# Patient Record
Sex: MALE | Race: WHITE | Employment: OTHER | URBAN - METROPOLITAN AREA
[De-identification: names, ages, dates, MRNs, and addresses within clinical notes are randomized per-mention and may not be internally consistent; named-entity substitution may affect disease eponyms.]

---

## 2018-09-12 ENCOUNTER — HOSPITAL ENCOUNTER (EMERGENCY)
Age: 58
Discharge: HOME | End: 2018-09-12
Attending: EMERGENCY MEDICINE

## 2018-09-12 ENCOUNTER — APPOINTMENT (OUTPATIENT)
Dept: GENERAL RADIOLOGY | Age: 58
End: 2018-09-12

## 2018-09-12 VITALS
HEART RATE: 130 BPM | WEIGHT: 180 LBS | SYSTOLIC BLOOD PRESSURE: 147 MMHG | DIASTOLIC BLOOD PRESSURE: 99 MMHG | OXYGEN SATURATION: 100 % | RESPIRATION RATE: 25 BRPM | TEMPERATURE: 98.5 F

## 2018-09-12 DIAGNOSIS — K59.00 CONSTIPATION, UNSPECIFIED CONSTIPATION TYPE: Primary | ICD-10-CM

## 2018-09-12 LAB
A/G RATIO: 2 (ref 1.1–2.2)
ALBUMIN SERPL-MCNC: 4.9 G/DL (ref 3.4–5)
ALP BLD-CCNC: 69 U/L (ref 40–129)
ALT SERPL-CCNC: 47 U/L (ref 10–40)
ANION GAP SERPL CALCULATED.3IONS-SCNC: 13 MMOL/L (ref 3–16)
AST SERPL-CCNC: 34 U/L (ref 15–37)
BASOPHILS ABSOLUTE: 0.1 K/UL (ref 0–0.2)
BASOPHILS RELATIVE PERCENT: 1.3 %
BILIRUB SERPL-MCNC: 0.4 MG/DL (ref 0–1)
BUN BLDV-MCNC: 14 MG/DL (ref 7–20)
CALCIUM SERPL-MCNC: 10.1 MG/DL (ref 8.3–10.6)
CHLORIDE BLD-SCNC: 101 MMOL/L (ref 99–110)
CO2: 26 MMOL/L (ref 21–32)
CREAT SERPL-MCNC: 1.3 MG/DL (ref 0.9–1.3)
EOSINOPHILS ABSOLUTE: 0.2 K/UL (ref 0–0.6)
EOSINOPHILS RELATIVE PERCENT: 1.9 %
GFR AFRICAN AMERICAN: >60
GFR NON-AFRICAN AMERICAN: 57
GLOBULIN: 2.4 G/DL
GLUCOSE BLD-MCNC: 174 MG/DL (ref 70–99)
HCT VFR BLD CALC: 41.7 % (ref 40.5–52.5)
HEMOGLOBIN: 14.6 G/DL (ref 13.5–17.5)
LYMPHOCYTES ABSOLUTE: 1.9 K/UL (ref 1–5.1)
LYMPHOCYTES RELATIVE PERCENT: 20.5 %
MCH RBC QN AUTO: 28.7 PG (ref 26–34)
MCHC RBC AUTO-ENTMCNC: 35 G/DL (ref 31–36)
MCV RBC AUTO: 81.9 FL (ref 80–100)
MONOCYTES ABSOLUTE: 0.6 K/UL (ref 0–1.3)
MONOCYTES RELATIVE PERCENT: 6.3 %
NEUTROPHILS ABSOLUTE: 6.6 K/UL (ref 1.7–7.7)
NEUTROPHILS RELATIVE PERCENT: 70 %
PDW BLD-RTO: 12.8 % (ref 12.4–15.4)
PLATELET # BLD: 285 K/UL (ref 135–450)
PMV BLD AUTO: 7.6 FL (ref 5–10.5)
POTASSIUM SERPL-SCNC: 3.9 MMOL/L (ref 3.5–5.1)
RBC # BLD: 5.1 M/UL (ref 4.2–5.9)
SODIUM BLD-SCNC: 140 MMOL/L (ref 136–145)
TOTAL PROTEIN: 7.3 G/DL (ref 6.4–8.2)
WBC # BLD: 9.4 K/UL (ref 4–11)

## 2018-09-12 PROCEDURE — 96361 HYDRATE IV INFUSION ADD-ON: CPT

## 2018-09-12 PROCEDURE — 74018 RADEX ABDOMEN 1 VIEW: CPT

## 2018-09-12 PROCEDURE — 93005 ELECTROCARDIOGRAM TRACING: CPT | Performed by: EMERGENCY MEDICINE

## 2018-09-12 PROCEDURE — 2580000003 HC RX 258: Performed by: EMERGENCY MEDICINE

## 2018-09-12 PROCEDURE — 96360 HYDRATION IV INFUSION INIT: CPT

## 2018-09-12 PROCEDURE — 99284 EMERGENCY DEPT VISIT MOD MDM: CPT

## 2018-09-12 PROCEDURE — 80053 COMPREHEN METABOLIC PANEL: CPT

## 2018-09-12 PROCEDURE — 85025 COMPLETE CBC W/AUTO DIFF WBC: CPT

## 2018-09-12 PROCEDURE — 6370000000 HC RX 637 (ALT 250 FOR IP): Performed by: EMERGENCY MEDICINE

## 2018-09-12 RX ORDER — BISACODYL 10 MG
10 SUPPOSITORY, RECTAL RECTAL ONCE
Status: COMPLETED | OUTPATIENT
Start: 2018-09-12 | End: 2018-09-12

## 2018-09-12 RX ORDER — POLYETHYLENE GLYCOL 3350 17 G/17G
17 POWDER, FOR SOLUTION ORAL DAILY PRN
Qty: 527 G | Refills: 1 | Status: SHIPPED | OUTPATIENT
Start: 2018-09-12 | End: 2018-10-12

## 2018-09-12 RX ORDER — 0.9 % SODIUM CHLORIDE 0.9 %
1000 INTRAVENOUS SOLUTION INTRAVENOUS ONCE
Status: COMPLETED | OUTPATIENT
Start: 2018-09-12 | End: 2018-09-12

## 2018-09-12 RX ADMIN — SODIUM CHLORIDE 1000 ML: 9 INJECTION, SOLUTION INTRAVENOUS at 13:13

## 2018-09-12 RX ADMIN — BISACODYL 10 MG: 10 SUPPOSITORY RECTAL at 13:13

## 2018-09-12 ASSESSMENT — PAIN SCALES - GENERAL: PAINLEVEL_OUTOF10: 5

## 2018-09-12 NOTE — ED NOTES
Nisha Sterling RN at bedside to chaperone the insertion of suppository      Flor Phelps RN  09/12/18 0088

## 2018-09-12 NOTE — ED NOTES
Saline lock removed intact. IV site looked unremarkable. Pressure dressing applied. Discharge instructions reviewed with Mr. Brandi Lambert. He verbalized understanding. Copy of discharge instructions and prescriptions given. Mr. Brandi Lambert was discharged to home in good condition per personal vehicle, friend/family driving. He exited the ED without difficulty.         Eddy Rocha RN  09/12/18 9843

## 2018-09-12 NOTE — ED PROVIDER NOTES
Current Facility-Administered Medications   Medication Dose Route Frequency Provider Last Rate Last Dose    0.9 % sodium chloride bolus  1,000 mL Intravenous Once Va Avila MD 1,000 mL/hr at 09/12/18 1313 1,000 mL at 09/12/18 1313     Current Outpatient Prescriptions   Medication Sig Dispense Refill    polyethylene glycol (MIRALAX) packet Take 17 g by mouth daily as needed for Other (Constipation) May repeat hourly until you have a bowel movement. If you take too much this will cause diarrhea. You can take this daily to prevent constipation 527 g 1     No Known Allergies    Nursing Notes Reviewed    Physical Exam:  ED Triage Vitals [09/12/18 1228]   Enc Vitals Group      BP (!) 159/114      Pulse 160      Resp 25      Temp 98.5 °F (36.9 °C)      Temp Source Oral      SpO2 100 %      Weight 180 lb (81.6 kg)      Height       Head Circumference       Peak Flow       Pain Score       Pain Loc       Pain Edu? Excl. in [address]? My pulse ox interpretation is - normal    General appearance:  No acute distress. Very anxious  Skin:  Warm. Moist  Eye:  Extraocular movements intact. Ears, nose, mouth and throat:  Oral mucosa moist .  Throat is clear  Neck:  Trachea midline. Supple. Full range of motion   Extremity:  No swelling. Normal ROM     Heart:  Regular rate and rhythm,  Perfusion:  intact  Respiratory:  Lungs clear to auscultation bilaterally. Respirations nonlabored. Abdominal:  Normal bowel sounds. Soft. Nontender. Non distended. Back:  No CVA tenderness to palpation     Neurological:  Alert and oriented times 3. No focal neuro deficits.              Psychiatric:  Appropriate    I have reviewed and interpreted all of the currently available lab results from this visit (if applicable):  Results for orders placed or performed during the hospital encounter of 09/12/18   CBC Auto Differential   Result Value Ref Range    WBC 9.4 4.0 - 11.0 K/uL    RBC 5.10 4.20 - 5.90 M/uL Hemoglobin 14.6 13.5 - 17.5 g/dL    Hematocrit 41.7 40.5 - 52.5 %    MCV 81.9 80.0 - 100.0 fL    MCH 28.7 26.0 - 34.0 pg    MCHC 35.0 31.0 - 36.0 g/dL    RDW 12.8 12.4 - 15.4 %    Platelets 258 483 - 827 K/uL    MPV 7.6 5.0 - 10.5 fL    Neutrophils % 70.0 %    Lymphocytes % 20.5 %    Monocytes % 6.3 %    Eosinophils % 1.9 %    Basophils % 1.3 %    Neutrophils # 6.6 1.7 - 7.7 K/uL    Lymphocytes # 1.9 1.0 - 5.1 K/uL    Monocytes # 0.6 0.0 - 1.3 K/uL    Eosinophils # 0.2 0.0 - 0.6 K/uL    Basophils # 0.1 0.0 - 0.2 K/uL   Comprehensive Metabolic Panel   Result Value Ref Range    Sodium 140 136 - 145 mmol/L    Potassium 3.9 3.5 - 5.1 mmol/L    Chloride 101 99 - 110 mmol/L    CO2 26 21 - 32 mmol/L    Anion Gap 13 3 - 16    Glucose 174 (H) 70 - 99 mg/dL    BUN 14 7 - 20 mg/dL    CREATININE 1.3 0.9 - 1.3 mg/dL    GFR Non- 57 (A) >60    GFR African American >60 >60    Calcium 10.1 8.3 - 10.6 mg/dL    Total Protein 7.3 6.4 - 8.2 g/dL    Alb 4.9 3.4 - 5.0 g/dL    Albumin/Globulin Ratio 2.0 1.1 - 2.2    Total Bilirubin 0.4 0.0 - 1.0 mg/dL    Alkaline Phosphatase 69 40 - 129 U/L    ALT 47 (H) 10 - 40 U/L    AST 34 15 - 37 U/L    Globulin 2.4 g/dL   EKG 12 Lead   Result Value Ref Range    Ventricular Rate 150 BPM    Atrial Rate 150 BPM    P-R Interval 142 ms    QRS Duration 74 ms    Q-T Interval 336 ms    QTc Calculation (Bazett) 530 ms    P Axis 40 degrees    R Axis 30 degrees    T Axis 37 degrees    Diagnosis       Sinus tachycardia with Fusion complexesNonspecific ST and T wave abnormalityAbnormal ECG      Radiographs (if obtained):  [] The following radiograph was interpreted by myself in the absence of a radiologist:   [x] Radiologist's Report Reviewed:  XR ABDOMEN (KUB) (SINGLE AP VIEW)   Final Result   Indeterminate bowel-gas pattern with no dilated loops seen. Moderate colonic   stool burden.                EKG (if obtained): (All EKG's are interpreted by myself in the absence of a cardiologist)EKG shows sinus tachycardia with some fusion complexes, nonspecific ST and T-wave changes no STEMI this is an abnormal EKG. No prior EKG for comparison. Chart review shows recent radiographs:  No results found. MDM:  67-year-old male with constipation. Patient's hemoglobin is stable. His rectal bleeding is most likely from a tear from his constipation. However, cannot rule out any other causes. I have started him on medications to allow him to have a better bowel movements, but he should follow up with a primary care provider and should consider a colonoscopy. There is no signs of obstruction at this time, but if he should get worse or not able to have a bowel movement. He can return at any time. And as noted. Follow-up with primary care. Clinical Impression:  1. Constipation, unspecified constipation type      Disposition referral (if applicable):  Jess Fernandez  [phone]  Schedule an appointment as soon as possible for a visit in 2 days      [facility] ED  [address] [facility]  [phone]    If symptoms worsen    Disposition medications (if applicable):  New Prescriptions    POLYETHYLENE GLYCOL (MIRALAX) PACKET    Take 17 g by mouth daily as needed for Other (Constipation) May repeat hourly until you have a bowel movement. If you take too much this will cause diarrhea. You can take this daily to prevent constipation       Comment: Please note this report has been produced using speech recognition software and may contain errors related to that system including errors in grammar, punctuation, and spelling, as well as words and phrases that may be inappropriate. If there are any questions or concerns please feel free to contact the dictating provider for clarification. Indira Sanchez MD  09/12/18 5525

## 2018-09-13 LAB
EKG ATRIAL RATE: 150 BPM
EKG DIAGNOSIS: NORMAL
EKG P AXIS: 40 DEGREES
EKG P-R INTERVAL: 142 MS
EKG Q-T INTERVAL: 336 MS
EKG QRS DURATION: 74 MS
EKG QTC CALCULATION (BAZETT): 530 MS
EKG R AXIS: 30 DEGREES
EKG T AXIS: 37 DEGREES
EKG VENTRICULAR RATE: 150 BPM

## 2018-09-13 PROCEDURE — 93010 ELECTROCARDIOGRAM REPORT: CPT | Performed by: INTERNAL MEDICINE

## 2024-08-25 ENCOUNTER — APPOINTMENT (OUTPATIENT)
Dept: GENERAL RADIOLOGY | Age: 64
End: 2024-08-25

## 2024-08-25 ENCOUNTER — HOSPITAL ENCOUNTER (OUTPATIENT)
Age: 64
Setting detail: OBSERVATION
Discharge: HOME | End: 2024-08-26
Attending: EMERGENCY MEDICINE | Admitting: INTERNAL MEDICINE

## 2024-08-25 ENCOUNTER — APPOINTMENT (OUTPATIENT)
Dept: CT IMAGING | Age: 64
End: 2024-08-25

## 2024-08-25 DIAGNOSIS — R06.02 SHORTNESS OF BREATH: ICD-10-CM

## 2024-08-25 DIAGNOSIS — R06.00 DYSPNEA, UNSPECIFIED TYPE: ICD-10-CM

## 2024-08-25 DIAGNOSIS — R79.89 HIGH SERUM LACTATE: Primary | ICD-10-CM

## 2024-08-25 DIAGNOSIS — R00.0 TACHYCARDIA: ICD-10-CM

## 2024-08-25 LAB
ALBUMIN SERPL-MCNC: 4.7 G/DL (ref 3.4–5)
ALBUMIN/GLOB SERPL: 2.2 {RATIO} (ref 1.1–2.2)
ALP SERPL-CCNC: 57 U/L (ref 40–129)
ALT SERPL-CCNC: 39 U/L (ref 10–40)
ANION GAP SERPL CALCULATED.3IONS-SCNC: 19 MMOL/L (ref 3–16)
AST SERPL-CCNC: 29 U/L (ref 15–37)
BASE EXCESS BLDV CALC-SCNC: -1.1 MMOL/L (ref -3–3)
BASOPHILS # BLD: 0.1 K/UL (ref 0–0.2)
BASOPHILS NFR BLD: 1 %
BILIRUB SERPL-MCNC: 0.8 MG/DL (ref 0–1)
BILIRUB UR QL STRIP.AUTO: NEGATIVE
BUN SERPL-MCNC: 9 MG/DL (ref 7–20)
CALCIUM SERPL-MCNC: 9.9 MG/DL (ref 8.3–10.6)
CHLORIDE SERPL-SCNC: 99 MMOL/L (ref 99–110)
CLARITY UR: CLEAR
CO2 BLDV-SCNC: 19 MMOL/L
CO2 SERPL-SCNC: 18 MMOL/L (ref 21–32)
COHGB MFR BLDV: 1.5 % (ref 0–1.5)
COLOR UR: YELLOW
CREAT SERPL-MCNC: 1.2 MG/DL (ref 0.8–1.3)
D-DIMER QUANTITATIVE: <0.27 UG/ML FEU (ref 0–0.6)
DEPRECATED RDW RBC AUTO: 13.1 % (ref 12.4–15.4)
EKG ATRIAL RATE: 127 BPM
EKG DIAGNOSIS: NORMAL
EKG P AXIS: 63 DEGREES
EKG P-R INTERVAL: 180 MS
EKG Q-T INTERVAL: 282 MS
EKG QRS DURATION: 66 MS
EKG QTC CALCULATION (BAZETT): 409 MS
EKG R AXIS: 40 DEGREES
EKG T AXIS: 56 DEGREES
EKG VENTRICULAR RATE: 127 BPM
EOSINOPHIL # BLD: 0 K/UL (ref 0–0.6)
EOSINOPHIL NFR BLD: 0.6 %
FLUAV RNA RESP QL NAA+PROBE: NOT DETECTED
FLUBV RNA RESP QL NAA+PROBE: NOT DETECTED
GFR SERPLBLD CREATININE-BSD FMLA CKD-EPI: 68 ML/MIN/{1.73_M2}
GLUCOSE SERPL-MCNC: 133 MG/DL (ref 70–99)
GLUCOSE UR STRIP.AUTO-MCNC: NEGATIVE MG/DL
HCO3 BLDV-SCNC: 18.5 MMOL/L (ref 23–29)
HCT VFR BLD AUTO: 44.2 % (ref 40.5–52.5)
HGB BLD-MCNC: 14.9 G/DL (ref 13.5–17.5)
HGB UR QL STRIP.AUTO: NEGATIVE
KETONES UR STRIP.AUTO-MCNC: NEGATIVE MG/DL
LACTATE BLDV-SCNC: 2.9 MMOL/L (ref 0.4–1.9)
LACTATE BLDV-SCNC: 3.3 MMOL/L (ref 0.4–1.9)
LEUKOCYTE ESTERASE UR QL STRIP.AUTO: ABNORMAL
LYMPHOCYTES # BLD: 2.4 K/UL (ref 1–5.1)
LYMPHOCYTES NFR BLD: 38.6 %
MAGNESIUM SERPL-MCNC: 2 MG/DL (ref 1.8–2.4)
MCH RBC QN AUTO: 28.6 PG (ref 26–34)
MCHC RBC AUTO-ENTMCNC: 33.8 G/DL (ref 31–36)
MCV RBC AUTO: 84.6 FL (ref 80–100)
METHGB MFR BLDV: 0.3 %
MONOCYTES # BLD: 0.4 K/UL (ref 0–1.3)
MONOCYTES NFR BLD: 6.6 %
NEUTROPHILS # BLD: 3.3 K/UL (ref 1.7–7.7)
NEUTROPHILS NFR BLD: 53.2 %
NITRITE UR QL STRIP.AUTO: NEGATIVE
NT-PROBNP SERPL-MCNC: <36 PG/ML (ref 0–124)
O2 CT VFR BLDV CALC: 20 VOL %
O2 THERAPY: ABNORMAL
PCO2 BLDV: 20.8 MMHG (ref 40–50)
PH BLDV: 7.57 [PH] (ref 7.35–7.45)
PH UR STRIP.AUTO: 8 [PH] (ref 5–8)
PLATELET # BLD AUTO: 266 K/UL (ref 135–450)
PMV BLD AUTO: 9.7 FL (ref 5–10.5)
PO2 BLDV: 50.3 MMHG (ref 25–40)
POTASSIUM SERPL-SCNC: 3.5 MMOL/L (ref 3.5–5.1)
PROCALCITONIN SERPL IA-MCNC: 0.04 NG/ML (ref 0–0.15)
PROT SERPL-MCNC: 6.8 G/DL (ref 6.4–8.2)
PROT UR STRIP.AUTO-MCNC: NEGATIVE MG/DL
RBC # BLD AUTO: 5.22 M/UL (ref 4.2–5.9)
RBC #/AREA URNS HPF: NORMAL /HPF (ref 0–4)
SAO2 % BLDV: 91 %
SARS-COV-2 RNA RESP QL NAA+PROBE: NOT DETECTED
SODIUM SERPL-SCNC: 136 MMOL/L (ref 136–145)
SP GR UR STRIP.AUTO: 1.01 (ref 1–1.03)
TROPONIN, HIGH SENSITIVITY: 11 NG/L (ref 0–22)
TROPONIN, HIGH SENSITIVITY: 9 NG/L (ref 0–22)
TSH SERPL DL<=0.005 MIU/L-ACNC: 2.28 UIU/ML (ref 0.27–4.2)
UA COMPLETE W REFLEX CULTURE PNL UR: ABNORMAL
UA DIPSTICK W REFLEX MICRO PNL UR: YES
URN SPEC COLLECT METH UR: ABNORMAL
UROBILINOGEN UR STRIP-ACNC: 0.2 E.U./DL
WBC # BLD AUTO: 6.2 K/UL (ref 4–11)
WBC #/AREA URNS HPF: NORMAL /HPF (ref 0–5)

## 2024-08-25 PROCEDURE — 71250 CT THORAX DX C-: CPT

## 2024-08-25 PROCEDURE — 99285 EMERGENCY DEPT VISIT HI MDM: CPT

## 2024-08-25 PROCEDURE — 2580000003 HC RX 258: Performed by: NURSE PRACTITIONER

## 2024-08-25 PROCEDURE — 99223 1ST HOSP IP/OBS HIGH 75: CPT | Performed by: NURSE PRACTITIONER

## 2024-08-25 PROCEDURE — 93010 ELECTROCARDIOGRAM REPORT: CPT | Performed by: INTERNAL MEDICINE

## 2024-08-25 PROCEDURE — 84443 ASSAY THYROID STIM HORMONE: CPT

## 2024-08-25 PROCEDURE — 80061 LIPID PANEL: CPT

## 2024-08-25 PROCEDURE — 71045 X-RAY EXAM CHEST 1 VIEW: CPT

## 2024-08-25 PROCEDURE — 85025 COMPLETE CBC W/AUTO DIFF WBC: CPT

## 2024-08-25 PROCEDURE — 83880 ASSAY OF NATRIURETIC PEPTIDE: CPT

## 2024-08-25 PROCEDURE — 84145 PROCALCITONIN (PCT): CPT

## 2024-08-25 PROCEDURE — 6360000002 HC RX W HCPCS: Performed by: EMERGENCY MEDICINE

## 2024-08-25 PROCEDURE — 96374 THER/PROPH/DIAG INJ IV PUSH: CPT

## 2024-08-25 PROCEDURE — 96361 HYDRATE IV INFUSION ADD-ON: CPT

## 2024-08-25 PROCEDURE — 85379 FIBRIN DEGRADATION QUANT: CPT

## 2024-08-25 PROCEDURE — G0378 HOSPITAL OBSERVATION PER HR: HCPCS

## 2024-08-25 PROCEDURE — 83605 ASSAY OF LACTIC ACID: CPT

## 2024-08-25 PROCEDURE — 82803 BLOOD GASES ANY COMBINATION: CPT

## 2024-08-25 PROCEDURE — 84484 ASSAY OF TROPONIN QUANT: CPT

## 2024-08-25 PROCEDURE — 83036 HEMOGLOBIN GLYCOSYLATED A1C: CPT

## 2024-08-25 PROCEDURE — 80053 COMPREHEN METABOLIC PANEL: CPT

## 2024-08-25 PROCEDURE — 2580000003 HC RX 258: Performed by: EMERGENCY MEDICINE

## 2024-08-25 PROCEDURE — 93005 ELECTROCARDIOGRAM TRACING: CPT | Performed by: EMERGENCY MEDICINE

## 2024-08-25 PROCEDURE — 83735 ASSAY OF MAGNESIUM: CPT

## 2024-08-25 PROCEDURE — 87636 SARSCOV2 & INF A&B AMP PRB: CPT

## 2024-08-25 PROCEDURE — 81001 URINALYSIS AUTO W/SCOPE: CPT

## 2024-08-25 RX ORDER — SODIUM CHLORIDE 0.9 % (FLUSH) 0.9 %
5-40 SYRINGE (ML) INJECTION PRN
Status: DISCONTINUED | OUTPATIENT
Start: 2024-08-25 | End: 2024-08-26 | Stop reason: HOSPADM

## 2024-08-25 RX ORDER — ENOXAPARIN SODIUM 100 MG/ML
40 INJECTION SUBCUTANEOUS DAILY
Status: DISCONTINUED | OUTPATIENT
Start: 2024-08-25 | End: 2024-08-26 | Stop reason: HOSPADM

## 2024-08-25 RX ORDER — POTASSIUM CHLORIDE 1500 MG/1
40 TABLET, EXTENDED RELEASE ORAL PRN
Status: DISCONTINUED | OUTPATIENT
Start: 2024-08-25 | End: 2024-08-26 | Stop reason: HOSPADM

## 2024-08-25 RX ORDER — POLYETHYLENE GLYCOL 3350 17 G/17G
17 POWDER, FOR SOLUTION ORAL DAILY PRN
Status: DISCONTINUED | OUTPATIENT
Start: 2024-08-25 | End: 2024-08-26 | Stop reason: HOSPADM

## 2024-08-25 RX ORDER — 0.9 % SODIUM CHLORIDE 0.9 %
1000 INTRAVENOUS SOLUTION INTRAVENOUS ONCE
Status: COMPLETED | OUTPATIENT
Start: 2024-08-25 | End: 2024-08-25

## 2024-08-25 RX ORDER — ONDANSETRON 2 MG/ML
4 INJECTION INTRAMUSCULAR; INTRAVENOUS EVERY 6 HOURS PRN
Status: DISCONTINUED | OUTPATIENT
Start: 2024-08-25 | End: 2024-08-26 | Stop reason: HOSPADM

## 2024-08-25 RX ORDER — MAGNESIUM SULFATE IN WATER 40 MG/ML
2000 INJECTION, SOLUTION INTRAVENOUS PRN
Status: DISCONTINUED | OUTPATIENT
Start: 2024-08-25 | End: 2024-08-26 | Stop reason: HOSPADM

## 2024-08-25 RX ORDER — ACETAMINOPHEN 650 MG/1
650 SUPPOSITORY RECTAL EVERY 6 HOURS PRN
Status: DISCONTINUED | OUTPATIENT
Start: 2024-08-25 | End: 2024-08-26 | Stop reason: HOSPADM

## 2024-08-25 RX ORDER — SODIUM CHLORIDE 0.9 % (FLUSH) 0.9 %
5-40 SYRINGE (ML) INJECTION EVERY 12 HOURS SCHEDULED
Status: DISCONTINUED | OUTPATIENT
Start: 2024-08-25 | End: 2024-08-26 | Stop reason: HOSPADM

## 2024-08-25 RX ORDER — ONDANSETRON 4 MG/1
4 TABLET, ORALLY DISINTEGRATING ORAL EVERY 8 HOURS PRN
Status: DISCONTINUED | OUTPATIENT
Start: 2024-08-25 | End: 2024-08-26 | Stop reason: HOSPADM

## 2024-08-25 RX ORDER — POTASSIUM CHLORIDE 7.45 MG/ML
10 INJECTION INTRAVENOUS PRN
Status: DISCONTINUED | OUTPATIENT
Start: 2024-08-25 | End: 2024-08-26 | Stop reason: HOSPADM

## 2024-08-25 RX ORDER — ACETAMINOPHEN 325 MG/1
650 TABLET ORAL EVERY 6 HOURS PRN
Status: DISCONTINUED | OUTPATIENT
Start: 2024-08-25 | End: 2024-08-26 | Stop reason: HOSPADM

## 2024-08-25 RX ORDER — SODIUM CHLORIDE 9 MG/ML
INJECTION, SOLUTION INTRAVENOUS PRN
Status: DISCONTINUED | OUTPATIENT
Start: 2024-08-25 | End: 2024-08-26 | Stop reason: HOSPADM

## 2024-08-25 RX ORDER — LORAZEPAM 2 MG/ML
0.5 INJECTION INTRAMUSCULAR
Status: COMPLETED | OUTPATIENT
Start: 2024-08-25 | End: 2024-08-25

## 2024-08-25 RX ADMIN — LORAZEPAM 0.5 MG: 2 INJECTION INTRAMUSCULAR; INTRAVENOUS at 09:23

## 2024-08-25 RX ADMIN — SODIUM CHLORIDE, PRESERVATIVE FREE 10 ML: 5 INJECTION INTRAVENOUS at 20:43

## 2024-08-25 RX ADMIN — SODIUM CHLORIDE 1000 ML: 9 INJECTION, SOLUTION INTRAVENOUS at 09:26

## 2024-08-25 ASSESSMENT — PAIN - FUNCTIONAL ASSESSMENT
PAIN_FUNCTIONAL_ASSESSMENT: NONE - DENIES PAIN
PAIN_FUNCTIONAL_ASSESSMENT: NONE - DENIES PAIN

## 2024-08-25 ASSESSMENT — LIFESTYLE VARIABLES
HOW MANY STANDARD DRINKS CONTAINING ALCOHOL DO YOU HAVE ON A TYPICAL DAY: PATIENT DOES NOT DRINK
HOW MANY STANDARD DRINKS CONTAINING ALCOHOL DO YOU HAVE ON A TYPICAL DAY: PATIENT DOES NOT DRINK
HOW OFTEN DO YOU HAVE A DRINK CONTAINING ALCOHOL: NEVER
HOW OFTEN DO YOU HAVE A DRINK CONTAINING ALCOHOL: NEVER

## 2024-08-25 NOTE — H&P
Hospital Medicine History & Physical      Date of Admission: 8/25/2024    Date of Service:  Pt seen/examined on 8/25/24      [x]Admitted to Inpatient with expected LOS greater than two midnights due to medical therapy.  []Placed in Observation status.    Chief Admission Complaint:    Shortness of Breath (Felt short of breath since last Tuesday.)     Presenting Admission History:    \" Selvin Ngo is a 64 y.o. male who presents to the emergency department for evaluation of shortness of breath.  Reports feeling short of breath last Tuesday.  Since then, has been feeling persistently short of breath.  Denies having anything like this in the past.  No history of asthma or COPD.  Remote history of smoking (quit over 20 years ago).      Feels like he can breathe better when he lays over to his left side.  Had some runny nose.  No fevers or chills.  Denies cough but noted to have a cough while I was in the room.      Reports he has been feeling short of breath with exertion, such as mowing the lawn. Does not have doctors because he does not have insurance.      No other complaints, modifying factors or associated symptoms.     The patient denies any fever/chills or other signs/sxs of systemic illness or identifiable aggravating/alleviating factors\"    ED work up: HTN on admission 160-140/100,  on arrival,100% on RA, CXR no acute processes, ECG St non acute, DDimer WNL Trop normal. COVID and flu negative, BNP WNL.     The pt was admitted under hosptialist service:     On y exam pt is in room NAD, denies any sob, \" I have had no issues since I have been here\". He notes that the sob is intermittent and that last night he felt like his heart rate was fast and pounding. No other complaints. He states he has lost about 12 pind recently but cut the cookies out seems intentional.        Assessment/Plan:      Current Principal Problem:  Dyspnea    Dyspnea: uncertain etiology. Hx of remote smoking and ETOh use but  Conjunctivae/corneas clear.  Respiratory:  Normal respiratory effort. Clear to auscultation bilaterally without Rales/Wheezes/Rhonchi.  Cardiovascular:  Regular rate and rhythm with normal S1/S2 without murmurs, rubs or gallops.  Abdomen:  Soft, non-tender, non-distended with normal bowel sounds.  Musculoskeletal:  No clubbing, cyanosis or edema bilaterally.  Full range of motion without deformity.  Neurologic:  Neurovascularly intact without any focal sensory/motor deficits. Cranial nerves: II-XII intact, grossly non-focal.  Psychiatric:  Alert and oriented, thought content appropriate, normal insight  Skin:  Skin color, texture, turgor normal.  No rashes or lesions.  Capillary Refill:  Brisk,< 3 seconds   Peripheral Pulses:  +2 palpable, equal bilaterally     Diet: [x]Adult/General  []Cardiac  []Diabetic  []Low Fat  []NPO  []NPO after Midnight  []Other     DVT Prophylaxis: [x]PPx LMWH  []SQ Heparin  []IPC/SCDs  []Eliquis  []Xarelto  []Coumadin     Code status: [x]Full  []DNR/CCA  []Limited (DNR/CCA with Do Not Intubate)  []DNRCC    PT/OT Eval Status:   []NOT yet ordered  []Ordered and Pending   []Seen with Recommendations for:  []Home independently  []Home w/ assist  []HHC  []SNF  []Acute Rehab    Anticipated Discharge Day/Date:  Patient is likely to remain in-house at least until 8/26 pending clinical course, subspecialty recs and PT/OT eval/recs if/when medically appropriate.       Anticipated Discharge Location: [x]Home  []HHC  []SNF  []Acute Rehab  []ECF  []LTAC  []Hospice    Time spent on examination, evaluation, counseling and management of the patient outside of separately billed procedures is 75 minutes.     Consults:      IP CONSULT TO HOSPITALIST      I personally have obtained, updated and/or reviewed the patient’s medication list on 8/25/2024

## 2024-08-25 NOTE — FLOWSHEET NOTE
08/25/24 1400   Vital Signs   Temp 98.1 °F (36.7 °C)   Temp Source Oral   Pulse 76   Heart Rate Source Monitor   Respirations 16   BP (!) 140/95   MAP (Calculated) 110   BP Location Left upper arm   BP Method Automatic   Patient Position High fowlers   Pain Assessment   Pain Assessment None - Denies Pain   Opioid-Induced Sedation   POSS Score 1   Oxygen Therapy   SpO2 98 %   O2 Device None (Room air)     Patient arrived via w/c from ER.  VSS. Skin w/d. Resp ee unlabored. Orders released. Oriented to room/call light.  Nrsg asmt completed. Call light in easy reach. No s/s distress noted. Telemetry in place

## 2024-08-25 NOTE — ED PROVIDER NOTES
CULTURE - Abnormal; Notable for the following components:    Leukocyte Esterase, Urine SMALL (*)     All other components within normal limits   COVID-19 & INFLUENZA COMBO   CBC WITH AUTO DIFFERENTIAL   BRAIN NATRIURETIC PEPTIDE   TROPONIN   MAGNESIUM   D-DIMER, QUANTITATIVE   PROCALCITONIN   TROPONIN   MICROSCOPIC URINALYSIS       When ordered only abnormal lab results are displayed. All other labs were within normal range or not returned as of this dictation.    EKG  The EKG, as interpreted by myself, in the emergency department in the absence of a cardiologist.    The Ekg interpreted by me shows  Sinus tachycardia with a rate of 127  Axis is normal  QTc is normal  Intervals and Durations are unremarkable.      ST Segments: Nonspecific ST changes    RADIOLOGY:     Non-plain film images such as CT, Ultrasound and MRI are read by the radiologist. Plain radiographic images personally reviewed.     Interpretation per the Radiologist below, if available at the time of this note:  XR CHEST PORTABLE   Final Result   1.  No acute abnormality.           XR CHEST PORTABLE    Result Date: 8/25/2024  EXAMINATION: ONE XRAY VIEW OF THE CHEST 8/25/2024 8:36 am COMPARISON: None available. HISTORY: ORDERING SYSTEM PROVIDED HISTORY: soa TECHNOLOGIST PROVIDED HISTORY: Reason for exam:->soa Reason for Exam: Shortness of breath FINDINGS: The lungs are clear.  The cardiac silhouette is within normal limits.  There is no pneumothorax or pleural effusion.     1.  No acute abnormality.         Bedside Ultrasound, as interpreted by me, if performed:    No results found.    PROCEDURES     Unless otherwise noted below, none     Procedures    CRITICAL CARE TIME     I personally spent a total of 31 minutes of critical care time in obtaining history, performing a physical exam, bedside monitoring of interventions, collecting and interpreting tests and discussion with consultants but excluding time spent performing procedures, treating other  patients and teaching time.                                                                                                         EMERGENCY DEPARTMENT COURSE and DIFFERENTIAL DIAGNOSIS/MDM:     Patient seen and evaluated. At presentation, patient was awake, alert, afebrile, clinically nontoxic-appearing, hypertensive to 161/104, tachycardic to 126, and satting 100% on room air.  He was placed on cardiac monitor for close observation.  Differential diagnosis includes ACS, arrhythmia, PE, pneumonia, others.  he was given 1 L IV fluid bolus. VBG with pH of 7.566, pCO2 20, bicarb 18.  Lactate elevated at 3.3.  No leukocytosis.  Hemoglobin normal at 14.9.  Creatinine is 1.2.  No change compared to baseline.  BNP less than 36.  Troponin is within normal limits at 9.  D-dimer was less than 0.27.  Therefore, patient at low risk for PE and no further imaging indicated at this time.  Chest x-ray shows no acute abnormality.  After 1 L IV fluid bolus, repeat lactic again elevated 2.9.  Heart rate is improved to 81.  I discussed the results of the workup with patient.  He denies having any liver issues.  Denies having any reason to be dehydrated.  Has not been vomiting or having diarrhea.  Has no abdominal pain.  Denies any alcohol use in the past several years.  He reports sister passed away recently, but he has not been feeling anxious.  Says he had multiple family members passed away over the last several years and never had persistent shortness of breath like this.  Does not feel like it is due to anxiety.  Reports having exertional shortness of breath that has become more persistent over the past week.  Says he still feels short of breath currently.  Given this, hospitalist consulted for admission for further evaluation and treatment.  Admit.    Discussion with Other Profesionals : Admitting Team - hospitalist    Social Determinants Significantly Affecting Health : Patient does not have insurance-  Does not have any

## 2024-08-25 NOTE — PROGRESS NOTES
4 Eyes Skin Assessment     NAME:  Selvin Ngo  YOB: 1960  MEDICAL RECORD NUMBER:  [id number]    The patient is being assessed for  Admission    I agree that at least one RN has performed a thorough Head to Toe Skin Assessment on the patient. ALL assessment sites listed below have been assessed.      Areas assessed by both nurses:    Head, Face, Ears, Shoulders, Back, Chest, and Arms, Elbows, Hands        Refused lower extremity and feet.  States no skin issues here for breathing     Does the Patient have a Wound? no       Tarik Prevention initiated by RN: No  Wound Care Orders initiated by RN: No    Pressure Injury (Stage 3,4, Unstageable, DTI, NWPT, and Complex wounds) if present, place Wound referral order by RN under : No    New Ostomies, if present place, Ostomy referral order under : No     Nurse 1 eSignature: Electronically signed by Pooja Woods RN on 8/25/24 at 2:26 PM EDT    **SHARE this note so that the co-signing nurse can place an eSignature**    Nurse 2 eSignature: Electronically signed by Silvia Ross RN on 8/25/24 at 2:29 PM EDT      Patient is able to demonstrate the ability to move from a reclining position to an upright position within the recliner.

## 2024-08-26 ENCOUNTER — APPOINTMENT (OUTPATIENT)
Age: 64
End: 2024-08-26

## 2024-08-26 VITALS
HEART RATE: 80 BPM | HEIGHT: 66 IN | SYSTOLIC BLOOD PRESSURE: 135 MMHG | OXYGEN SATURATION: 98 % | TEMPERATURE: 98.5 F | DIASTOLIC BLOOD PRESSURE: 86 MMHG | BODY MASS INDEX: 26.03 KG/M2 | RESPIRATION RATE: 61 BRPM | WEIGHT: 162 LBS

## 2024-08-26 LAB
CHOLEST SERPL-MCNC: 162 MG/DL (ref 0–199)
ECHO AO ASC DIAM: 3.4 CM
ECHO AO ASCENDING AORTA INDEX: 1.86 CM/M2
ECHO AO ROOT DIAM: 3.8 CM
ECHO AO ROOT INDEX: 2.08 CM/M2
ECHO AV MEAN GRADIENT: 8 MMHG
ECHO AV MEAN VELOCITY: 1.3 M/S
ECHO AV PEAK GRADIENT: 14 MMHG
ECHO AV PEAK VELOCITY: 1.8 M/S
ECHO AV VELOCITY RATIO: 0.61
ECHO AV VTI: 27.9 CM
ECHO BSA: 1.85 M2
ECHO IVC EXP: 1.6 CM
ECHO IVC INSP: 0.6 CM
ECHO LA AREA 2C: 17.1 CM2
ECHO LA AREA 4C: 12.9 CM2
ECHO LA MAJOR AXIS: 5.1 CM
ECHO LA MINOR AXIS: 5.1 CM
ECHO LA VOL BP: 34 ML (ref 18–58)
ECHO LA VOL MOD A2C: 46 ML (ref 18–58)
ECHO LA VOL MOD A4C: 25 ML (ref 18–58)
ECHO LA VOL/BSA BIPLANE: 19 ML/M2 (ref 16–34)
ECHO LA VOLUME INDEX MOD A2C: 25 ML/M2 (ref 16–34)
ECHO LA VOLUME INDEX MOD A4C: 14 ML/M2 (ref 16–34)
ECHO LV E' LATERAL VELOCITY: 16 CM/S
ECHO LV E' SEPTAL VELOCITY: 11 CM/S
ECHO LV EF PHYSICIAN: 68 %
ECHO LV FRACTIONAL SHORTENING: 39 % (ref 28–44)
ECHO LV INTERNAL DIMENSION DIASTOLE INDEX: 2.68 CM/M2
ECHO LV INTERNAL DIMENSION DIASTOLIC: 4.9 CM (ref 4.2–5.9)
ECHO LV INTERNAL DIMENSION SYSTOLIC INDEX: 1.64 CM/M2
ECHO LV INTERNAL DIMENSION SYSTOLIC: 3 CM
ECHO LV IVSD: 0.9 CM (ref 0.6–1)
ECHO LV MASS 2D: 153 G (ref 88–224)
ECHO LV MASS INDEX 2D: 83.6 G/M2 (ref 49–115)
ECHO LV POSTERIOR WALL DIASTOLIC: 0.9 CM (ref 0.6–1)
ECHO LV RELATIVE WALL THICKNESS RATIO: 0.37
ECHO LVOT AV VTI INDEX: 0.64
ECHO LVOT MEAN GRADIENT: 3 MMHG
ECHO LVOT PEAK GRADIENT: 5 MMHG
ECHO LVOT PEAK VELOCITY: 1.1 M/S
ECHO LVOT VTI: 17.9 CM
ECHO MV A VELOCITY: 1.24 M/S
ECHO MV E VELOCITY: 0.59 M/S
ECHO MV E/A RATIO: 0.48
ECHO MV E/E' LATERAL: 3.69
ECHO MV E/E' RATIO (AVERAGED): 4.53
ECHO MV E/E' SEPTAL: 5.36
ECHO MV LVOT VTI INDEX: 1.33
ECHO MV MAX VELOCITY: 1.6 M/S
ECHO MV MEAN GRADIENT: 6 MMHG
ECHO MV MEAN VELOCITY: 1.2 M/S
ECHO MV PEAK GRADIENT: 10 MMHG
ECHO MV VTI: 23.8 CM
ECHO PV MAX VELOCITY: 1.4 M/S
ECHO PV MEAN GRADIENT: 5 MMHG
ECHO PV MEAN VELOCITY: 1 M/S
ECHO PV PEAK GRADIENT: 8 MMHG
ECHO PV VTI: 21.5 CM
ECHO RA AREA 4C: 9.8 CM2
ECHO RA END SYSTOLIC VOLUME APICAL 4 CHAMBER INDEX BSA: 10 ML/M2
ECHO RA VOLUME: 19 ML
ECHO RV BASAL DIMENSION: 3.3 CM
ECHO RV FREE WALL PEAK S': 19 CM/S
ECHO RV LONGITUDINAL DIMENSION: 7.9 CM
ECHO RV MID DIMENSION: 2.4 CM
ECHO RV TAPSE: 2.3 CM (ref 1.7–?)
EST. AVERAGE GLUCOSE BLD GHB EST-MCNC: 105.4 MG/DL
HBA1C MFR BLD: 5.3 %
HDLC SERPL-MCNC: 47 MG/DL (ref 40–60)
LDLC SERPL CALC-MCNC: 98 MG/DL
TRIGL SERPL-MCNC: 87 MG/DL (ref 0–150)
VLDLC SERPL CALC-MCNC: 17 MG/DL

## 2024-08-26 PROCEDURE — G0378 HOSPITAL OBSERVATION PER HR: HCPCS

## 2024-08-26 PROCEDURE — 93306 TTE W/DOPPLER COMPLETE: CPT | Performed by: INTERNAL MEDICINE

## 2024-08-26 PROCEDURE — 6360000002 HC RX W HCPCS: Performed by: NURSE PRACTITIONER

## 2024-08-26 PROCEDURE — 93306 TTE W/DOPPLER COMPLETE: CPT

## 2024-08-26 PROCEDURE — 96372 THER/PROPH/DIAG INJ SC/IM: CPT

## 2024-08-26 RX ADMIN — ENOXAPARIN SODIUM 40 MG: 100 INJECTION SUBCUTANEOUS at 08:57

## 2024-08-26 NOTE — PROGRESS NOTES
Report given @ bedside to Sudha TREVINO, for transferral of care. Pt resting in bed. New D/C ordered noted, AVS information completed by neto Treivno and Sudha will discharge. Call light in reach.

## 2024-08-26 NOTE — CARE COORDINATION
Case Management Assessment  Initial Evaluation    Date/Time of Evaluation: 8/26/2024 12:35 PM  Assessment Completed by: Juanita Vila    If patient is discharged prior to next notation, then this note serves as note for discharge by case management.    Patient Name: Selvin Ngo                   YOB: 1960  Diagnosis: Shortness of breath [R06.02]  Dyspnea [R06.00]  Tachycardia [R00.0]  High serum lactate [R79.89]  Dyspnea, unspecified type [R06.00]                   Date / Time: 8/25/2024  8:06 AM    Patient Admission Status: Observation   Readmission Risk (Low < 19, Mod (19-27), High > 27): No data recorded  Current PCP: No primary care provider on file.  PCP verified by CM? Yes (none)    Chart Reviewed: Yes      History Provided by: Patient  Patient Orientation: Alert and Oriented    Patient Cognition: Alert    Hospitalization in the last 30 days (Readmission):  No    If yes, Readmission Assessment in CM Navigator will be completed.    Advance Directives:      Code Status: Full Code   Patient's Primary Decision Maker is: Patient Declined (Legal Next of Kin Remains as Decision Maker)      Discharge Planning:    Patient lives with: Alone Type of Home: House  Primary Care Giver: Self  Patient Support Systems include: Family Members   Current Financial resources: Medicare  Current community resources: None  Current services prior to admission: None            Current DME:              Type of Home Care services:  None    ADLS  Prior functional level: Independent in ADLs/IADLs  Current functional level: Independent in ADLs/IADLs    PT AM-PAC:   /24  OT AM-PAC:   /24    Family can provide assistance at DC: No  Would you like Case Management to discuss the discharge plan with any other family members/significant others, and if so, who? No  Plans to Return to Present Housing: Yes  Other Identified Issues/Barriers to RETURNING to current housing: none  Potential Assistance needed at discharge: N/A

## 2024-08-26 NOTE — PLAN OF CARE
Problem: Discharge Planning  Goal: Discharge to home or other facility with appropriate resources  8/25/2024 2230 by Lorin Anderson, RN  Outcome: Progressing  Flowsheets (Taken 8/25/2024 1423 by Pooja Woods RN)  Discharge to home or other facility with appropriate resources: Identify barriers to discharge with patient and caregiver  8/25/2024 1418 by Pooja Woods RN  Outcome: Progressing     Problem: Respiratory - Adult  Goal: Achieves optimal ventilation and oxygenation  8/25/2024 2230 by Lorin Anderson, RN  Outcome: Progressing  8/25/2024 1418 by Pooja Woods RN  Outcome: Progressing

## 2024-08-26 NOTE — PROGRESS NOTES
No acute events noted throughout the shift. Provider was notified at 0115 of patient going angel to as low as 45. VSS. Patient asymptomatic with no complaints. No new complaints or issues voiced. Patient up independently in room. Glasses and one hearing aid on bedside table. Call bell within reach and side rails up x 2.

## 2024-08-26 NOTE — DISCHARGE INSTRUCTIONS
Your information:  Name: Selvin Ngo  : 1960    Your instructions:    Follow-up [facility] in 1 week, call for appointment [phone]. They take your medicare and do a sliding scale payment plan.       What to do after you leave the hospital:    Recommended diet: regular diet    Recommended activity: activity as tolerated        The following personal items were collected during your admission and were returned to you:    Belongings  Dental Appliances: Uppers  Vision - Corrective Lenses: Eyeglasses  Hearing Aid: Left hearing aid  Clothing: Belt, Footwear, Pants, Shirt, Undergarments  Jewelry: None  Body Piercings Removed: No  Electronic Devices: Cell Phone  Weapons (Notify Protective Services/Security): None  Other Valuables: Wallet, [place]  Home Medications: None  Valuables Given To: Patient  Provide Name(s) of Who Valuable(s) Were Given To: patient  Responsible person(s) in the waiting room: selvin  Patient approves for provider to speak to responsible person post operatively: Yes    Information obtained by:  By signing below, I understand that if any problems occur once I leave the hospital I am to contact PCP.  I understand and acknowledge receipt of the instructions indicated above.

## 2024-08-26 NOTE — PROGRESS NOTES
IM Progress Note    Admit Date:  8/25/2024    Subjective:  Mr. Ngo ***    Objective:   /86   Pulse 93   Temp 98.2 °F (36.8 °C) (Oral)   Resp 16   Ht 1.676 m (5' 6\")   Wt 73.5 kg (162 lb)   SpO2 98%   BMI 26.15 kg/m²     Intake/Output Summary (Last 24 hours) at 8/26/2024 1609  Last data filed at 8/26/2024 0429  Gross per 24 hour   Intake 220 ml   Output 0 ml   Net 220 ml         Physical Exam:  General:  Awake, alert, NAD  Skin:  Warm and dry  Neck:  JVD absent. Neck supple  Chest:  Clear to auscultation, respiration easy. No wheezes, rales or rhonchi.   Cardiovascular:  RRR ,S1S2 normal  Abdomen:  Soft, non tender, non distended, BS +  Extremities:  No edema.  Intact peripheral pulses. Brisk cap refill, < 2 secs  Neuro: non focal      Medications:   Scheduled Meds:   sodium chloride flush  5-40 mL IntraVENous 2 times per day    enoxaparin  40 mg SubCUTAneous Daily       Continuous Infusions:   sodium chloride         Data:  CBC:   Recent Labs     08/25/24  0837   WBC 6.2   RBC 5.22   HGB 14.9   HCT 44.2   MCV 84.6   RDW 13.1        BMP:   Recent Labs     08/25/24  0837      K 3.5   CL 99   CO2 18*   BUN 9   CREATININE 1.2     BNP: No results for input(s): \"BNP\" in the last 72 hours.  PT/INR: No results for input(s): \"PROTIME\", \"INR\" in the last 72 hours.  APTT: No results for input(s): \"APTT\" in the last 72 hours.  CARDIAC ENZYMES: No results for input(s): \"CKMB\", \"CKMBINDEX\", \"TROPONINI\" in the last 72 hours.    Invalid input(s): \"CKTOTAL;3\"  FASTING LIPID PANEL:  Lab Results   Component Value Date    CHOL 162 08/25/2024    HDL 47 08/25/2024    TRIG 87 08/25/2024     LIVER PROFILE:   Recent Labs     08/25/24  0837   AST 29   ALT 39   BILITOT 0.8   ALKPHOS 57          Cultures  ***  Results       Procedure Component Value Units Date/Time    COVID-19 & Influenza Combo [[id number]] Collected: 08/25/24 0858    Order Status: Completed Specimen: Nasopharyngeal Swab Updated: 08/25/24 0915

## 2024-08-26 NOTE — PROGRESS NOTES
AM assessment completed, see flow sheet. Pt is alert and oriented. Vital signs are WNL. Respirations are even & easy on RA. CMU notified of 's. Pt reported up to BR and dyspneic after ambulation. ST at 111 HR. Pt SPO2 98%.  No other complaints voiced at this time. Dr. Oro updated.  Pt denies needs at this time. SR up x 2, and bed in low position. Call light is within reach.

## 2024-08-27 NOTE — PROGRESS NOTES
Prescriptions and discharge instructions given. Verbal and written education provided on: new medications, follow-up appointments, s/s to report to physician, diet, and activity. Pt verbalized understanding denies any questions/ needs at this time.    PIV removed from RFA, no complications, catheter intact, pressure held for 2 mins, and dressing applied that is CDI.     Immunizations are up to date.     Pt is stable for discharge at this time. All belongings are with patient at discharge.     Transport called to transport pt to vehicle with nephew, for discharge home.

## 2024-09-05 ENCOUNTER — CLINICAL DOCUMENTATION (OUTPATIENT)
Facility: HOSPITAL | Age: 64
End: 2024-09-05

## 2024-09-05 NOTE — PROGRESS NOTES
Reviewed chart, will follow up after his appointment on 9/5 with Dr. Graham. Pooja Daniels Financial Navigator

## 2024-09-06 PROBLEM — R00.0 TACHYCARDIA: Status: ACTIVE | Noted: 2024-09-06

## 2024-09-20 ENCOUNTER — TELEPHONE (OUTPATIENT)
Dept: PULMONOLOGY | Age: 64
End: 2024-09-20